# Patient Record
(demographics unavailable — no encounter records)

---

## 2025-05-02 NOTE — HISTORY OF PRESENT ILLNESS
[Back] : back [___ mths] : [unfilled] month(s) ago [4] : a current pain level of 4/10 [10] : a maximum pain level of 10/10 [Sharp] : sharp [Left] : left [Posterior] : posterior aspect of the [Calf] : calf [Sitting] : sitting [Standing] : standing [Walking] : walking [Bending] : bending [Insomnia] : insomnia [Gait Dysfunction] : gait dysfunction [PT] : PT [Chiropractor] : chiropractor [Medications] : medications [Injections] : injections [FreeTextEntry4] : laying on the right side or her stomach [FreeTextEntry6] : Gabapentin, Lyrica, Meloxicam, had 2 epidural injections with Dr. Jean at [facility] but then went for chiropractic care which worsened in her pain

## 2025-05-02 NOTE — ASSESSMENT
[FreeTextEntry1] : 49 year old female with low back and lumbar radicular pain secondary to disc protrusion.  We did discuss lumbar epidural steroid injection.  She is already scheduled for epidural injection with Dr. Jean.  She will follow up with me thereafter as necessary.  We would consider left L5 TFESI.

## 2025-05-02 NOTE — DATA REVIEWED
[MRI] : MRI [FreeTextEntry1] : MRI lumbar spine done 4/24/25 showed evidence of left greater than right neural foraminal stenosis with left L5 impingement and mild indentation of the thecal sac with abutment of both descending nerve roots at L5 and S1.  There is disc bulging at L4-L5 with severe facet arthrosis and moderate right joint effusion and right L5 impingement with left L4 impingement due to neural foraminal stenosis.  Bilateral L3-L4 facet arthritis and protrusion with left L3 impingement

## 2025-05-28 NOTE — PROCEDURE
[de-identified] : Patient agreed to performing left GT bursa injection.  Patient was place in the right lateral decubitus position.  The area was palpated and marked.  After thoroughly cleansing each area with alcohol, a total of 4 cc of 0.25% Bupivicaine mixed with 80mg DepoMedrol was injected using a 27 gauge 1.5 inch needle in a fan-like distribution after negative aspiration.  After each injection, the needle was removed and hemostasis was achieved.  Patient tolerated the procedure well without complaint or complication.

## 2025-05-28 NOTE — HISTORY OF PRESENT ILLNESS
[FreeTextEntry1] : Patient returns after being seen a few weeks ago.  She did undergo a series of 3 epidural injections by Dr. Forte.  She does feel some improvement in her left lower leg symptoms but she continues to have intense pain in her left hip which has been preventing her from walking without severe pain.  She does report having a "hip" injections about 4-5 months ago which helped her pain tremendously.  She is here to discuss her treatment options.

## 2025-05-28 NOTE — ASSESSMENT
[FreeTextEntry1] : 49 year old female with left GT bursitis.  Patient tolerated left GT bursa injection without complaint or complication.  She noticed immediate improvement in her symptoms.  She will follow up with me thereafter as necessary

## 2025-06-03 NOTE — DISCUSSION/SUMMARY
[de-identified] : 49yF pw severe L GTB, has failed nsaid/inj/PT.  The patient was extensively counseled on treatment options including but not limited to observation, rest/activity modification, bracing, anti-inflammatory medications, physical therapy, injections, and surgery.  The natural history of the disease was thoroughly explained.   To better diagnose their condition, I recommended MRI. The patient will proceed with: -MRI -diclofenac rx provided - pt instructed to take with meals and not with other nsaid's including advil, aleve, and toradol.  The pt understands to stop taking the medication if any stomach sx develop or they develop any type of bleeding or bruising, at which point they will present to their PMD -pt was instructed on the importance of resting, icing and elevating to minimize swelling -RTC after MRI   I have personally obtained the history, reviewed the ROS as noted, and performed the physical examination today.  The patient and I discussed the assessment and options and developed the plan.  All questions were answered and the patient stated their understanding of the treatment plan and appreciation of the visit.   My cumulative time spent on this patient's visit included: Preparation for the visit, review of the medical records, review of pertinent diagnostic studies, examination and counseling of the patient on the above diagnosis, treatment plan and prognosis, orders of diagnostic tests, medications and/or appropriate procedures and documentation in the medical records of today's visit.   Aaron Ivey MD

## 2025-06-03 NOTE — HISTORY OF PRESENT ILLNESS
[de-identified] : 49yF pw intractable L lateral hip pain.  Hx sciatica but numerous ROSHAN w [facility] PMNR with no help.  Has done PT for months for both back and hip. Saw Dr. Malcolm and great but transient relief of hip pain with GT csi.  Difficulty ambulating due to pain, worried about son's grad/prom. Tried dose pack with minimal relief, low ambulatory endurance due to lateral hip pain

## 2025-06-03 NOTE — PHYSICAL EXAM
[de-identified] :  Side: Skin in tact Tenderness: GT   Hip ROM                               Flexion              Extension               IR               ER Affected               120                    10                         25              40 Normal                  120                    10                         25              40   Strength                              Flexion              Extension           Abduction        Adduction  Affected               5                        5                               5                     5                     Normal                  5                        5                               5                     5  Provocative Tests: Log roll  (-) FADIR   (+) DALJIT   (-) Ej   (-) Resisted SLR  (-)   [de-identified] : The following radiographs were ordered and read by me during this patient's visit. I reviewed each radiograph in detail with the patient and discussed the findings as highlighted below.   2 views of the L hip were obtained today that show no fracture, or dislocation. There are no degenerative changes seen. There is no malalignment. No obvious osseous abnormality. Otherwise unremarkable.

## 2025-06-09 NOTE — PHYSICAL EXAM
[de-identified] :  Side: Skin in tact Tenderness: GT   Hip ROM                               Flexion              Extension               IR               ER Affected               120                    10                         25              40 Normal                  120                    10                         25              40   Strength                              Flexion              Extension           Abduction        Adduction  Affected               5                        5                               5                     5                     Normal                  5                        5                               5                     5  Provocative Tests: Log roll  (-) FADIR   (+) DALJIT   (-) Ej   (-) Resisted SLR  (-)   [de-identified] : The following radiographs were ordered and read by me during this patient's visit. I reviewed each radiograph in detail with the patient and discussed the findings as highlighted below.   2 views of the L hip were obtained today that show no fracture, or dislocation. There are no degenerative changes seen. There is no malalignment. No obvious osseous abnormality. Otherwise unremarkable.   MRI - severe GTB, no frx, labral tear

## 2025-06-09 NOTE — HISTORY OF PRESENT ILLNESS
[de-identified] : 49yF pw intractable L lateral hip pain.  Hx sciatica but numerous ROSHAN w [facility] PMNR with no help.  Has done PT for months for both back and hip. Saw Dr. Malcolm and great but transient relief of hip pain with GT csi.  Difficulty ambulating due to pain, worried about son's grad/prom. Tried dose pack with minimal relief, low ambulatory endurance due to lateral hip pain  6/9 - 10/10 lateral hip pain

## 2025-06-09 NOTE — HISTORY OF PRESENT ILLNESS
[de-identified] : 49yF pw intractable L lateral hip pain.  Hx sciatica but numerous ROSHAN w [facility] PMNR with no help.  Has done PT for months for both back and hip. Saw Dr. Malcolm and great but transient relief of hip pain with GT csi.  Difficulty ambulating due to pain, worried about son's grad/prom. Tried dose pack with minimal relief, low ambulatory endurance due to lateral hip pain  6/9 - 10/10 lateral hip pain

## 2025-06-09 NOTE — DISCUSSION/SUMMARY
[de-identified] : 49yF pw severe L GTB, has failed nsaid/inj/PT.  The patient was extensively counseled on treatment options including but not limited to observation, rest/activity modification, bracing, anti-inflammatory medications, physical therapy, injections, and surgery.  The natural history of the disease was thoroughly explained.   The risks, benefits, and alternatives to an injection were reviewed with the patient.  Risks outlined include but are not limited to infection, sepsis, bleeding, scarring, temporary increase in pain, syncope, failure to resolve symptoms, symptom recurrence, allergic reaction and a flare.  The patient understood these risks and wished to proceed with an injection, providing verbal consent. Under sterile conditions using chlorhexidine and an ethyl chloride spray for topic analgesia, an injection of 4cc 1% lidocaine without epinephrine and 2cc 40mg/ml depomedrol was placed in the L GT at the point of maximal tenderness.   The patient tolerated the procedure well without complication.  The patient was advised to call if redness, pain or fever occur and to apply ice for 15 minutes every hour for the next day as tolerated.   The patient will proceed with:  -diclofenac rx provided - pt instructed to take with meals and not with other nsaid's including advil, aleve, and toradol.  The pt understands to stop taking the medication if any stomach sx develop or they develop any type of bleeding or bruising, at which point they will present to their PMD -pt was instructed on the importance of resting, icing and elevating to minimize swelling -RTC 4w   I have personally obtained the history, reviewed the ROS as noted, and performed the physical examination today.  The patient and I discussed the assessment and options and developed the plan.  All questions were answered and the patient stated their understanding of the treatment plan and appreciation of the visit.   My cumulative time spent on this patient's visit included: Preparation for the visit, review of the medical records, review of pertinent diagnostic studies, examination and counseling of the patient on the above diagnosis, treatment plan and prognosis, orders of diagnostic tests, medications and/or appropriate procedures and documentation in the medical records of today's visit.   Aaron Ivey MD

## 2025-06-09 NOTE — PHYSICAL EXAM
[de-identified] :  Side: Skin in tact Tenderness: GT   Hip ROM                               Flexion              Extension               IR               ER Affected               120                    10                         25              40 Normal                  120                    10                         25              40   Strength                              Flexion              Extension           Abduction        Adduction  Affected               5                        5                               5                     5                     Normal                  5                        5                               5                     5  Provocative Tests: Log roll  (-) FADIR   (+) DALJIT   (-) Ej   (-) Resisted SLR  (-)   [de-identified] : The following radiographs were ordered and read by me during this patient's visit. I reviewed each radiograph in detail with the patient and discussed the findings as highlighted below.   2 views of the L hip were obtained today that show no fracture, or dislocation. There are no degenerative changes seen. There is no malalignment. No obvious osseous abnormality. Otherwise unremarkable.   MRI - severe GTB, no frx, labral tear

## 2025-06-12 NOTE — PROCEDURE
[de-identified] : [facility] [address] [place] [address], [id number] - [phone]  PATIENT: JUDE WRIGHT  MEDICAL RECORD #:   DATE OF OPERATION: 06/12/2025  PREOPERATIVE DIAGNOSIS:  LUMBAR FACET ARTHROPATHY POSTOPERATIVE DIAGNOSIS:  LUMBAR FACET ARTHROPATHY PROCEDURE: LUMBAR MEDIAL BRANCH BLOCK UNDER X-RAY GUIDANCE SURGEON:  CHRISTEN MALCOLM M.D. ANESTHEISA:  LOCAL EBL:  MINIMAL  INDICATIONS:  The patient returns to Pain Management with persistent back pain with radiation to the left side.  The pain has remained quite significant and interferes with the patients ability to perform ADLs despite the implementation of other conservative measures.  I discussed with the patient at length the risks, benefits, and expectations of the aforementioned procedure.  All of the patients questions were answered.  The patient signed informed consent and agreed to proceed.  PROCEDURE:  The patient was transported to the operating room, placed in the prone position and monitored non-invasively with stable vital signs and no complaints.  The patients back was prepped three times with betadine and draped in meticulous sterile fashion.  The left L3-L4 and L4-L5 facet joints were identified fluoroscopically and the skin overlying each level was infiltrated with 5cc of 1% preservative-free lidocaine using a 25 gauge one inch needle.  The junction between the inferior articulating process of L3 and the L4 transverse process and the junction between the inferior articulating process of L4 and the L5 transverse process was approached with a 22 gauge 3  inch spinal needle.  Aspiration was negative for CSF or heme.  Then, 0.5cc of Omnipaque 240 were injected to confirm needle location.  2 cc of preservative-free 1% lidocaine was then injected.  The needle tracts were flushed with 2cc of 1% lidocaine and the needles were removed.  A sterile bandage was applied to each site.  The patient tolerated the procedure well without complaint or complication.  The patient was observed in stable condition after the procedure for approximately 30 minutes before being discharged to home in the company of an adult.  The patient was provided with full written instructions.  The patient will be seen for follow-up in my office in 1 week but certainly sooner with any questions or concerns.    Christen Malcolm M.D.